# Patient Record
Sex: MALE | Race: AMERICAN INDIAN OR ALASKA NATIVE
[De-identification: names, ages, dates, MRNs, and addresses within clinical notes are randomized per-mention and may not be internally consistent; named-entity substitution may affect disease eponyms.]

---

## 2017-10-03 ENCOUNTER — HOSPITAL ENCOUNTER (OUTPATIENT)
Dept: HOSPITAL 5 - CT | Age: 82
Discharge: HOME | End: 2017-10-03
Attending: INTERNAL MEDICINE
Payer: MEDICARE

## 2017-10-03 DIAGNOSIS — M47.896: ICD-10-CM

## 2017-10-03 DIAGNOSIS — N20.0: ICD-10-CM

## 2017-10-03 DIAGNOSIS — J98.6: ICD-10-CM

## 2017-10-03 DIAGNOSIS — R91.1: ICD-10-CM

## 2017-10-03 DIAGNOSIS — M41.86: ICD-10-CM

## 2017-10-03 DIAGNOSIS — I70.0: ICD-10-CM

## 2017-10-03 DIAGNOSIS — I71.4: Primary | ICD-10-CM

## 2017-10-03 PROCEDURE — 74150 CT ABDOMEN W/O CONTRAST: CPT

## 2017-10-03 NOTE — CAT SCAN REPORT
CT abdomen and pelvis without contrast:



Abdominal aortic aneurysm.



Transverse images are obtained from lower chest to the ischium. 

Comparison is made to prior exam in November 2015. Coronal and sagittal 

2-D reformatted images included.



There is a 3 mm discrete nodule in the right lower lung. Cardiovascular 

calcification present. Moderate elevation of the right hemidiaphragm 

with interposition of the colon. Stable small nonobstructing right 

renal calculus. Extensive spondylosis and dextroscoliosis of the lumbar 

spine.



There is diffuse atherosclerosis of the abdominal aorta. There is 

aneurysmal dilatation of the distal aorta just proximal to the 

bifurcation with a maximum diameter of 3.7 cm. The length of the 

dilatation is approximately 4.1 cm.



Compared to the patient's prior exam there is slight enlargement of the 

aneurysm from 3.5 cm.



Impression:



1. Minimal increase size of abdominal aortic aneurysm since 2015.



2. Small right lower lobe pulmonary nodule not previously included on 

prior CT scans for comparison. Based on size, characteristics, and 

patient age followup recommended in conjunction with followup of 

abdominal aortic aneurysm.

## 2019-10-30 ENCOUNTER — HOSPITAL ENCOUNTER (OUTPATIENT)
Dept: HOSPITAL 5 - CT | Age: 84
Discharge: HOME | End: 2019-10-30
Attending: SURGERY
Payer: MEDICARE

## 2019-10-30 DIAGNOSIS — I71.4: Primary | ICD-10-CM

## 2019-10-30 LAB — BUN SERPL-MCNC: 10 MG/DL (ref 9–20)

## 2019-10-30 PROCEDURE — 84520 ASSAY OF UREA NITROGEN: CPT

## 2019-10-30 PROCEDURE — 74175 CTA ABDOMEN W/CONTRAST: CPT

## 2019-10-30 PROCEDURE — 82565 ASSAY OF CREATININE: CPT

## 2019-10-30 PROCEDURE — 36415 COLL VENOUS BLD VENIPUNCTURE: CPT

## 2019-10-30 NOTE — CAT SCAN REPORT
CTA abdomen and pelvis with contrast



INDICATION :  I71.4)Abdominal aortic aneurysm, without rupture.  Acute generalized abdominal pain



TECHNIQUE:  Axial imaging performed through the abdomen and pelvis, with contrast bolus timing set to
 maximize opacification of the aorta. Bilateral lower extremity runoff was also performed.  3-plane M
IP reformatted images were obtained.  All CT scans at this location are performed using CT dose reduc
tion for ALARA by means of automated exposure control. 



100 mL of intravenous contrast administered.



COMPARISON:  None



FINDINGS:  



Angiographic findings: There is an infrarenal abdominal aortic aneurysm measuring 3.7 cm in maximal t
ransverse dimension on image #116 of series #2 and spanning a length of 5.7 cm on image #74 of series
 300. There is otherwise moderate multifocal atherosclerotic disease with abdominal aorta and involvi
ng the branch vessels with at least 50% stenosis seen at the origin of the celiac axis. There is no a
cute hemorrhage, dissection, or other abnormality. There is ectasia of both common iliac arteries nandini
suring up to 1.5 cm on the right on image 141 and 1.6 cm on the left on image 130.



Non-angiographic findings: 



Lungs/bones:  Lung bases are clear. There are degenerative changes in the spine and pelvis with no ac
[place] osseous abnormality.



Abdomen/pelvis:  The liver, gallbladder, spleen, pancreas, adrenals, kidneys, and proximal GI tract a
ppear unremarkable.



Urinary bladder is indented by a mildly enlarged prostate. No gross mass identified. No pelvic free f
luid or acute colonic abnormality. There is colonic diverticulosis with nothing acute.



IMPRESSION: 

1.  3.7 cm infrarenal abdominal aortic aneurysm without acute abnormality.

2.  Additional incidental findings as above.



Signer Name: Julio Cesar Bauer MD 

Signed: 10/30/2019 12:16 PM

 Workstation Name: [id number]